# Patient Record
Sex: FEMALE | Race: BLACK OR AFRICAN AMERICAN | NOT HISPANIC OR LATINO | URBAN - METROPOLITAN AREA
[De-identification: names, ages, dates, MRNs, and addresses within clinical notes are randomized per-mention and may not be internally consistent; named-entity substitution may affect disease eponyms.]

---

## 2017-10-06 ENCOUNTER — HISTORICAL (OUTPATIENT)
Dept: RADIOLOGY | Facility: HOSPITAL | Age: 62
End: 2017-10-06

## 2019-01-16 ENCOUNTER — HISTORICAL (OUTPATIENT)
Dept: RADIOLOGY | Facility: HOSPITAL | Age: 64
End: 2019-01-16

## 2019-04-16 ENCOUNTER — HISTORICAL (OUTPATIENT)
Dept: RADIOLOGY | Facility: HOSPITAL | Age: 64
End: 2019-04-16

## 2023-03-14 ENCOUNTER — HOSPITAL ENCOUNTER (OUTPATIENT)
Dept: RADIOLOGY | Facility: HOSPITAL | Age: 68
Discharge: HOME | End: 2023-03-14
Attending: INTERNAL MEDICINE
Payer: COMMERCIAL

## 2023-03-14 DIAGNOSIS — Z12.31 BREAST CANCER SCREENING BY MAMMOGRAM: ICD-10-CM

## 2023-03-14 PROCEDURE — 77067 SCR MAMMO BI INCL CAD: CPT | Mod: TC

## 2023-03-14 PROCEDURE — 77063 BREAST TOMOSYNTHESIS BI: CPT | Mod: 26,,, | Performed by: RADIOLOGY

## 2023-03-14 PROCEDURE — 77067 MAMMO DIGITAL SCREENING BILAT WITH TOMO: ICD-10-PCS | Mod: 26,,, | Performed by: RADIOLOGY

## 2023-03-14 PROCEDURE — 77067 SCR MAMMO BI INCL CAD: CPT | Mod: 26,,, | Performed by: RADIOLOGY

## 2023-03-14 PROCEDURE — 77063 MAMMO DIGITAL SCREENING BILAT WITH TOMO: ICD-10-PCS | Mod: 26,,, | Performed by: RADIOLOGY

## 2023-12-22 ENCOUNTER — OFFICE VISIT (OUTPATIENT)
Dept: URGENT CARE | Facility: CLINIC | Age: 68
End: 2023-12-22
Payer: COMMERCIAL

## 2023-12-22 VITALS
RESPIRATION RATE: 18 BRPM | DIASTOLIC BLOOD PRESSURE: 71 MMHG | TEMPERATURE: 98 F | HEIGHT: 62 IN | BODY MASS INDEX: 23 KG/M2 | HEART RATE: 50 BPM | OXYGEN SATURATION: 97 % | WEIGHT: 125 LBS | SYSTOLIC BLOOD PRESSURE: 163 MMHG

## 2023-12-22 DIAGNOSIS — T14.8XXA MUSCLE STRAIN: Primary | ICD-10-CM

## 2023-12-22 PROCEDURE — 99213 OFFICE O/P EST LOW 20 MIN: CPT | Mod: ,,, | Performed by: FAMILY MEDICINE

## 2023-12-22 PROCEDURE — 99213 PR OFFICE/OUTPT VISIT, EST, LEVL III, 20-29 MIN: ICD-10-PCS | Mod: ,,, | Performed by: FAMILY MEDICINE

## 2023-12-22 RX ORDER — ACETAMINOPHEN 500 MG
1500 TABLET ORAL
COMMUNITY

## 2023-12-22 RX ORDER — TRAZODONE HYDROCHLORIDE 100 MG/1
100 TABLET ORAL NIGHTLY
COMMUNITY

## 2023-12-22 RX ORDER — METOPROLOL SUCCINATE 50 MG/1
50 TABLET, EXTENDED RELEASE ORAL EVERY MORNING
COMMUNITY

## 2023-12-22 RX ORDER — ATORVASTATIN CALCIUM 20 MG/1
1 TABLET, FILM COATED ORAL NIGHTLY
COMMUNITY
Start: 2023-07-13 | End: 2024-07-12

## 2023-12-22 RX ORDER — CYCLOBENZAPRINE HCL 5 MG
5 TABLET ORAL 3 TIMES DAILY PRN
Qty: 15 TABLET | Refills: 0 | Status: SHIPPED | OUTPATIENT
Start: 2023-12-22

## 2023-12-22 RX ORDER — ALBUTEROL SULFATE 90 UG/1
AEROSOL, METERED RESPIRATORY (INHALATION)
COMMUNITY
Start: 2023-01-03

## 2023-12-22 RX ORDER — AMLODIPINE BESYLATE 10 MG/1
1 TABLET ORAL EVERY MORNING
COMMUNITY
Start: 2023-10-20 | End: 2024-10-19

## 2023-12-22 RX ORDER — PREDNISONE 20 MG/1
40 TABLET ORAL DAILY
Qty: 8 TABLET | Refills: 0 | Status: SHIPPED | OUTPATIENT
Start: 2023-12-22 | End: 2023-12-26

## 2023-12-22 NOTE — PROGRESS NOTES
Patient ID: [id number]     Chief Complaint: back pain    History of Present Illness:     Dena Barron is a 68 y.o. female  with a history of type 2 diabetes, current smoker, who presents today for symptoms of Pain ( Patient is a 68 y.o. female who presents to urgent care with complaints of pain in right shoulder under scapula that has moved down towards her pelvis. Has released some gas and that has helped X4 days. /NO injury)      Pt denies experiencing any fevers, chills, nausea, vomiting, bowel or bladder dysfunction, sensory deficits around the groin or inner thighs, lower extremity motor or sensory deficits, unexplained weight loss, IVDU, corticosteroid use, immunocompromising conditions, immunosuppressing drugs, or history of cancer.     Past Medical History:     ----------------------------  Anxiety  Arthritis  Asthma  Hyperlipidemia  Hypertension     History reviewed. No pertinent surgical history.    Review of patient's allergies indicates:   Allergen Reactions    Penicillins Swelling    Enalapril Other (See Comments)     COUGH       Outpatient Medications Marked as Taking for the 12/22/23 encounter (Office Visit) with Paco Marie MD   Medication Sig Dispense Refill    acetaminophen (TYLENOL) 500 MG tablet Take 1,500 mg by mouth.      albuterol (PROVENTIL/VENTOLIN HFA) 90 mcg/actuation inhaler INHALE 2 PUFFS INTO LUNGS BY MOUTH EVERY 4 HOURS AS NEEDED FOR WHEEZING      amLODIPine (NORVASC) 10 MG tablet Take 1 tablet by mouth every morning.      atorvastatin (LIPITOR) 20 MG tablet Take 1 tablet by mouth every evening.      metoprolol succinate (TOPROL-XL) 50 MG 24 hr tablet Take 50 mg by mouth every morning.      traZODone (DESYREL) 100 MG tablet Take 100 mg by mouth every evening.         Social History     Socioeconomic History    Marital status:    Tobacco Use    Smoking status: Every Day     Types: Cigarettes    Smokeless tobacco: Never   Substance and Sexual Activity    Alcohol use:  "Never    Drug use: Never        Family History   Problem Relation Age of Onset    Hypertension Mother     Diabetes Mother     Hypertension Brother     Diabetes Brother         Subjective:     Review of Systems   Constitutional:  Negative for chills, fever and malaise/fatigue.   Musculoskeletal:  Positive for back pain. Negative for falls.   Neurological:  Negative for dizziness, tingling, sensory change, speech change, focal weakness, seizures and loss of consciousness.       Objective:     BP (!) 163/71   Pulse (!) 50   Temp 98.3 °F (36.8 °C)   Resp 18   Ht 5' 2" (1.575 m)   Wt 56.7 kg (125 lb)   SpO2 97%   BMI 22.86 kg/m²     Physical Exam  Constitutional:       General: She is not in acute distress.     Appearance: Normal appearance. She is not ill-appearing or toxic-appearing.   HENT:      Head: Normocephalic and atraumatic.   Cardiovascular:      Rate and Rhythm: Normal rate and regular rhythm.   Pulmonary:      Effort: Pulmonary effort is normal.      Breath sounds: Normal breath sounds.       Musculoskeletal:         General: Tenderness present. No swelling, deformity or signs of injury.      Cervical back: No rigidity or tenderness.      Right lower leg: No edema.      Left lower leg: No edema.      Comments: Back:  No obvious deformity  No skin changes  Tenderness above the right scapula  Tenderness 6 in right of midline at about T9  No midline spinal tenderness   Lymphadenopathy:      Cervical: No cervical adenopathy.   Neurological:      General: No focal deficit present.      Mental Status: She is alert and oriented to person, place, and time.      Cranial Nerves: No cranial nerve deficit.      Sensory: No sensory deficit.      Motor: No weakness.      Coordination: Coordination normal.      Gait: Gait normal.      Deep Tendon Reflexes: Reflexes normal.   Psychiatric:         Mood and Affect: Mood normal.         Behavior: Behavior normal.         Thought Content: Thought content normal. "         Assessment & Plan:       ICD-10-CM ICD-9-CM   1. Muscle strain  T14.8XXA 848.9        1. Muscle strain    Other orders  -     predniSONE (DELTASONE) 20 MG tablet; Take 2 tablets (40 mg total) by mouth once daily. for 4 days  Dispense: 8 tablet; Refill: 0  -     cyclobenzaprine (FLEXERIL) 5 MG tablet; Take 1 tablet (5 mg total) by mouth 3 (three) times daily as needed for Muscle spasms.  Dispense: 15 tablet; Refill: 0         Lungs clear, exam reveals that source of discomfort is musculoskeletal.  Declines any shots.  Is diabetic but A1c is 5.9, diet-controlled.  She is okay with 4 days of oral prednisone and muscle relaxers.  Declines trigger point injections.  Return precautions given

## 2023-12-22 NOTE — PATIENT INSTRUCTIONS
Prednisone once daily for 4 days as needed for discomfort    Flexeril up to 3 times daily.  May cause drowsiness and dizziness, please use caution    Please return if symptoms are not improved after 4 days.    Go to the emergency room if you develop shortness of breath, chest pain or severe pain in your back

## 2024-08-03 ENCOUNTER — OFFICE VISIT (OUTPATIENT)
Dept: URGENT CARE | Facility: CLINIC | Age: 69
End: 2024-08-03
Payer: COMMERCIAL

## 2024-08-03 VITALS
WEIGHT: 125 LBS | OXYGEN SATURATION: 97 % | HEART RATE: 62 BPM | HEIGHT: 62 IN | DIASTOLIC BLOOD PRESSURE: 85 MMHG | TEMPERATURE: 99 F | BODY MASS INDEX: 23 KG/M2 | RESPIRATION RATE: 18 BRPM | SYSTOLIC BLOOD PRESSURE: 190 MMHG

## 2024-08-03 DIAGNOSIS — J06.9 URI WITH COUGH AND CONGESTION: Primary | ICD-10-CM

## 2024-08-03 DIAGNOSIS — R05.9 COUGH, UNSPECIFIED TYPE: ICD-10-CM

## 2024-08-03 LAB
CTP QC/QA: YES
SARS-COV-2 RDRP RESP QL NAA+PROBE: NEGATIVE

## 2024-08-03 PROCEDURE — 87635 SARS-COV-2 COVID-19 AMP PRB: CPT | Mod: QW,,,

## 2024-08-03 PROCEDURE — 99213 OFFICE O/P EST LOW 20 MIN: CPT | Mod: ,,,

## 2024-08-03 RX ORDER — PROMETHAZINE HYDROCHLORIDE AND DEXTROMETHORPHAN HYDROBROMIDE 6.25; 15 MG/5ML; MG/5ML
5 SYRUP ORAL NIGHTLY PRN
Qty: 118 ML | Refills: 0 | Status: SHIPPED | OUTPATIENT
Start: 2024-08-03 | End: 2024-08-13

## 2024-08-03 RX ORDER — PREDNISONE 20 MG/1
20 TABLET ORAL DAILY
Qty: 5 TABLET | Refills: 0 | Status: SHIPPED | OUTPATIENT
Start: 2024-08-03 | End: 2024-08-08

## 2024-08-03 NOTE — PROGRESS NOTES
"Subjective:      Patient ID: Dena Barron is a 68 y.o. female.    Vitals:  height is 5' 2" (1.575 m) and weight is 56.7 kg (125 lb). Her temperature is 99 °F (37.2 °C). Her blood pressure is 190/85 (abnormal) and her pulse is 62. Her respiration is 18 and oxygen saturation is 97%.     Chief Complaint: Cough     Patient is a 68 y.o. female who presents to urgent care with complaints of runny nose, nasal congestion, coughing, body aches, low grade fever x2 days.  She denies temperature greater than 100.4, neck stiffness rash, GI symptoms, shortness for breath.  She does report frontal sinus headache.  She is a smoker and does report some mild wheezing at home.  Blood pressure is elevated in clinic today however she reports she usually takes her blood pressure medication around this time this morning.  Did not take it yet.  Was previously prescribed promethazine DM and reports this helped with her cough.  Would like this for nighttime cough.    ROS   Objective:     Physical Exam   Constitutional: She is oriented to person, place, and time. She appears well-developed. She is cooperative.  Non-toxic appearance. She does not appear ill. No distress.   HENT:   Head: Normocephalic and atraumatic.   Ears:   Right Ear: Hearing, tympanic membrane, external ear and ear canal normal.   Left Ear: Hearing, tympanic membrane, external ear and ear canal normal.   Nose: Congestion present. No mucosal edema, rhinorrhea or nasal deformity. No epistaxis. Right sinus exhibits no maxillary sinus tenderness and no frontal sinus tenderness. Left sinus exhibits no maxillary sinus tenderness and no frontal sinus tenderness.   Mouth/Throat: Uvula is midline, oropharynx is clear and moist and mucous membranes are normal. Mucous membranes are moist. No trismus in the jaw. Normal dentition. No uvula swelling. No oropharyngeal exudate, posterior oropharyngeal edema or posterior oropharyngeal erythema.      Comments: Clear postnasal drip  Eyes: " Conjunctivae and lids are normal. No scleral icterus.   Neck: Trachea normal and phonation normal. Neck supple. No edema present. No erythema present. No neck rigidity present.   Cardiovascular: Normal rate, regular rhythm, normal heart sounds and normal pulses.   Pulmonary/Chest: Effort normal and breath sounds normal. No respiratory distress. She has no decreased breath sounds. She has no rhonchi.   Abdominal: Normal appearance.   Musculoskeletal: Normal range of motion.         General: No deformity. Normal range of motion.   Neurological: She is alert and oriented to person, place, and time. She exhibits normal muscle tone. Coordination normal.   Skin: Skin is warm, dry, intact, not diaphoretic and not pale.   Psychiatric: Her speech is normal and behavior is normal. Judgment and thought content normal.   Nursing note and vitals reviewed.      Assessment:     1. URI with cough and congestion    2. Cough, unspecified type        Plan:       URI with cough and congestion  -     POCT COVID-19 Rapid Screening  -     predniSONE (DELTASONE) 20 MG tablet; Take 1 tablet (20 mg total) by mouth once daily. for 5 days  Dispense: 5 tablet; Refill: 0  -     promethazine-dextromethorphan (PROMETHAZINE-DM) 6.25-15 mg/5 mL Syrp; Take 5 mLs by mouth nightly as needed (cough).  Dispense: 118 mL; Refill: 0    Cough, unspecified type      Negative COVID swab  Take steroids with food.  Drink plenty of fluids. Get plenty of rest.   Claritin, Zyrtec or other over-the-counter antihistamine for runny nose, postnasal drip, nasal congestion.  Nasal spray such as Nasacort or Flonase for congestion.  Over-the-counter cough medication with expectorant such as guaifenesin for daytime cough.  Prescription cough syrup nightly as it may make you drowsy, do not drive while taking.  Prescription cough syrup has antihistamine present, do not combine with other antihistamines or cough medications.  Over-the-counter decongestants such as Sudafed,  phenylephrine or pseudoephedrine.  Avoid these if you have a history of high blood pressure.  Warm saltwater gargles for sore throat.  Warm water with honey to help coat the throat.  Throat lozenges.  Chloraseptic spray for worsening sore throat.  Tylenol or ibuprofen as needed for sore throat and fever.  May alternate every 3 hours.    Call or return to clinic as needed   Go to the ER with any significant change or worsening of symptoms.   Follow up with your primary care doctor.            Smoking cessation strongly encouraged.  Assistance offered, information will be provided.  If not ready to quit now, patient will contact this clinic in the future if I can be of any specific health related to the discontinuation of smoking/tobacco use.

## 2024-08-03 NOTE — PATIENT INSTRUCTIONS
Negative COVID swab  Take steroids with food.  Drink plenty of fluids. Get plenty of rest.   Claritin, Zyrtec or other over-the-counter antihistamine for runny nose, postnasal drip, nasal congestion.  Nasal spray such as Nasacort or Flonase for congestion.  Over-the-counter cough medication with expectorant such as guaifenesin for daytime cough.  Prescription cough syrup nightly as it may make you drowsy, do not drive while taking.  Prescription cough syrup has antihistamine present, do not combine with other antihistamines or cough medications.  Over-the-counter decongestants such as Sudafed, phenylephrine or pseudoephedrine.  Avoid these if you have a history of high blood pressure.  Warm saltwater gargles for sore throat.  Warm water with honey to help coat the throat.  Throat lozenges.  Chloraseptic spray for worsening sore throat.  Tylenol or ibuprofen as needed for sore throat and fever.  May alternate every 3 hours.    Call or return to clinic as needed   Go to the ER with any significant change or worsening of symptoms.   Follow up with your primary care doctor.            Smoking cessation strongly encouraged.  Assistance offered, information will be provided.  If not ready to quit now, patient will contact this clinic in the future if I can be of any specific health related to the discontinuation of smoking/tobacco use.

## 2024-10-23 ENCOUNTER — OFFICE VISIT (OUTPATIENT)
Dept: URGENT CARE | Facility: CLINIC | Age: 69
End: 2024-10-23
Payer: COMMERCIAL

## 2024-10-23 VITALS
RESPIRATION RATE: 18 BRPM | HEART RATE: 50 BPM | SYSTOLIC BLOOD PRESSURE: 171 MMHG | DIASTOLIC BLOOD PRESSURE: 73 MMHG | TEMPERATURE: 98 F | WEIGHT: 122 LBS | HEIGHT: 63 IN | OXYGEN SATURATION: 96 % | BODY MASS INDEX: 21.62 KG/M2

## 2024-10-23 DIAGNOSIS — J32.9 SINUSITIS, UNSPECIFIED CHRONICITY, UNSPECIFIED LOCATION: ICD-10-CM

## 2024-10-23 DIAGNOSIS — R05.9 COUGH, UNSPECIFIED TYPE: Primary | ICD-10-CM

## 2024-10-23 PROCEDURE — 99213 OFFICE O/P EST LOW 20 MIN: CPT | Mod: ,,, | Performed by: NURSE PRACTITIONER

## 2024-10-23 RX ORDER — PROMETHAZINE HYDROCHLORIDE AND DEXTROMETHORPHAN HYDROBROMIDE 6.25; 15 MG/5ML; MG/5ML
5 SYRUP ORAL EVERY 4 HOURS PRN
Qty: 120 ML | Refills: 0 | Status: SHIPPED | OUTPATIENT
Start: 2024-10-23 | End: 2024-11-02

## 2024-10-23 RX ORDER — ALENDRONATE SODIUM 70 MG/1
1 TABLET ORAL
COMMUNITY
Start: 2024-08-28

## 2024-10-23 RX ORDER — DOXYCYCLINE 100 MG/1
100 CAPSULE ORAL 2 TIMES DAILY
Qty: 14 CAPSULE | Refills: 0 | Status: SHIPPED | OUTPATIENT
Start: 2024-10-23 | End: 2024-10-30

## 2024-10-23 RX ORDER — BUPROPION HYDROCHLORIDE 150 MG/1
1 TABLET ORAL EVERY MORNING
COMMUNITY
Start: 2024-08-19 | End: 2025-08-19

## 2024-10-23 NOTE — PATIENT INSTRUCTIONS
Sinusitis (Antibiotic Treatment)    The sinuses are air-filled spaces within the bones of the face. They connect to the inside of the nose. Sinusitis is an inflammation of the tissue lining the sinus cavity. Sinus inflammation can occur during a cold. It can also be due to allergies to pollens and other particles in the air. Sinusitis can cause symptoms of sinus congestion and fullness. A sinus infection causes fever, headache and facial pain. There is often green or yellow drainage from the nose or into the back of the throat (post-nasal drip). You have been given antibiotics to treat this condition.  Home care:  Take the full course of antibiotics as instructed. Do not stop taking them, even if you feel better.  Drink plenty of water, hot tea, and other liquids. This may help thin mucus. It also may promote sinus drainage.  Heat may help soothe painful areas of the face. Use a towel soaked in hot water. Or,  the shower and direct the hot spray onto your face. Using a vaporizer along with a menthol rub at night may also help.   An expectorant containing guaifenesin may help thin the mucus and promote drainage from the sinuses.  Over-the-counter decongestants may be used unless a similar medicine was prescribed. Nasal sprays work the fastest. Use one that contains phenylephrine or oxymetazoline. First blow the nose gently. Then use the spray. Do not use these medicines more often than directed on the label or symptoms may get worse. You may also use tablets containing pseudoephedrine. Avoid products that combine ingredients, because side effects may be increased. Read labels. You can also ask the pharmacist for help. (NOTE: Persons with high blood pressure should not use decongestants. They can raise blood pressure.)  Over-the-counter antihistamines may help if allergies contributed to your sinusitis.    Do not use nasal rinses or irrigation during an acute sinus infection, unless told to by your health care  provider. Rinsing may spread the infection to other sinuses.  Use acetaminophen or ibuprofen to control pain, unless another pain medicine was prescribed. (If you have chronic liver or kidney disease or ever had a stomach ulcer, talk with your doctor before using these medicines. Aspirin should never be used in anyone under 18 years of age who is ill with a fever. It may cause severe liver damage.)  Don't smoke. This can worsen symptoms.  Follow-up care  Follow up with your healthcare provider or our staff if you are not improving within the next week.  When to seek medical advice  Call your healthcare provider if any of these occur:  Facial pain or headache becoming more severe  Stiff neck  Unusual drowsiness or confusion  Swelling of the forehead or eyelids  Vision problems, including blurred or double vision  Fever of 100.4ºF (38ºC) or higher, or as directed by your healthcare provider  Seizure  Breathing problems  Symptoms not resolving within 10 days  Date Last Reviewed: 4/13/2015 © 2000-2016 [employer] [address] [place] [address]. All rights reserved. This information is not intended as a substitute for professional medical care. Always follow your healthcare professional's instructions.                                                                    Sinusitis   If your condition worsens or fails to improve we recommend that you receive another evaluation at the ER immediately or contact your PCP to discuss your concerns or return here. You must understand that you've received an urgent care treatment only and that you may be released before all your medical problems are known or treated. You the patient will arrange for followup care as instructed.   If we discussed that I think your illness is viral it will not respond to antibiotics and it will last 10-14 days. However, if over the next few days the symptoms worsen start the antibiotics I have given you.   If we discussed  "that you require antibiotics start them now and take them to completion.   If you are female and on BCP and do take the antibiotics, use additional methods to prevent pregnancy while on the antibiotics and for one cycle after.   Flonase (fluticasone) is a nasal spray which is available over the counter and may help with your symptoms, Asterpro (Azelastine) is a nasal antihistamine that can also be taken    Zyrtec D, Claritin D or allegra D can also help with symptoms of congestion and drainage.   If you have hypertension avoid using the "D" which is the decongestant   If you just have drainage you can take plain zyrtec, claritin or allegra   If you just have a congested feeling you can take pseudoephedrine (unless you have high blood pressure) which you have to sign for behind the counter. Do not buy the phenylephrine which is on the shelf as it is not effective   Rest and fluids are also important.   Tylenol or ibuprofen can also be used as directed for pain unless you have an allergy to them or medical condition such as stomach ulcers, kidney or liver disease or blood thinners etc for which you should not be taking these type of medications.   If you are flying in the next few days Afrin nose drops for the airplane flight upon take off and landing may help. Other than at those times refrain from using afrin.   If you were prescribed a narcotic do not drive or operate heavy machinery while taking these medications.     -Please take antibiotic to completion.  -Below are suggestions for symptomatic relief:              -Tylenol every 4 hours OR ibuprofen every 6 hours as needed for pain/fever.              -Salt water gargles to soothe throat pain.              -Chloroseptic spray also helps to numb throat pain.              -Nasal saline spray reduces inflammation and dryness.              -Warm face compresses to help with facial sinus pain/pressure.              -Vicks vapor rub at night.              -Flonase OTC " or Nasacort OTC for nasal congestion.              -Simple foods like chicken noodle soup.              -Delsym helps with coughing at night              -Zyrtec/Claritin during the day & Benadryl at night may help with allergies.

## 2024-10-23 NOTE — PROGRESS NOTES
"Subjective:      Patient ID: Dena Barron is a 68 y.o. female.    Vitals:  height is 5' 3" (1.6 m) and weight is 55.3 kg (122 lb). Her oral temperature is 98.4 °F (36.9 °C). Her blood pressure is 171/73 (abnormal) and her pulse is 50 (abnormal). Her respiration is 18 and oxygen saturation is 96%.     Chief Complaint: Cough     Patient is a 68 y.o. female who presents to urgent care with complaints of productive cough, congestion, body aches x 5 days. Alleviating factors include Nyquil and Robitussin DM with no relief. Patient denies fever, sore throat, chills. Patient had negative at home COVID test. Patient is requesting PCP referral.     ROS   Objective:     Physical Exam   Constitutional: She is oriented to person, place, and time. She appears well-developed. She is cooperative.  Non-toxic appearance. She does not appear ill. No distress.   HENT:   Head: Normocephalic and atraumatic.   Ears:   Right Ear: Hearing, tympanic membrane, external ear and ear canal normal.   Left Ear: Hearing, tympanic membrane, external ear and ear canal normal.   Nose: No mucosal edema, rhinorrhea or nasal deformity. No epistaxis. Right sinus exhibits maxillary sinus tenderness. Right sinus exhibits no frontal sinus tenderness. Left sinus exhibits maxillary sinus tenderness. Left sinus exhibits no frontal sinus tenderness.   Mouth/Throat: Uvula is midline, oropharynx is clear and moist and mucous membranes are normal. No trismus in the jaw. Normal dentition. No uvula swelling. No oropharyngeal exudate, posterior oropharyngeal edema or posterior oropharyngeal erythema.   Eyes: Conjunctivae and lids are normal. No scleral icterus.   Neck: Trachea normal and phonation normal. Neck supple. No edema present. No erythema present. No neck rigidity present.   Cardiovascular: Normal rate, regular rhythm, normal heart sounds and normal pulses.   Pulmonary/Chest: Effort normal and breath sounds normal. No respiratory distress. She has no " decreased breath sounds. She has no rhonchi.   Abdominal: Normal appearance.   Musculoskeletal: Normal range of motion.         General: No deformity. Normal range of motion.   Neurological: She is alert and oriented to person, place, and time. She exhibits normal muscle tone. Coordination normal.   Skin: Skin is warm, dry, intact, not diaphoretic and not pale.   Psychiatric: Her speech is normal and behavior is normal. Judgment and thought content normal.   Nursing note and vitals reviewed.         Previous History      Review of patient's allergies indicates:   Allergen Reactions    Penicillins Swelling    Enalapril Other (See Comments)     COUGH       Past Medical History:   Diagnosis Date    Anxiety     Arthritis     Asthma     Hyperlipidemia     Hypertension      Current Outpatient Medications   Medication Instructions    acetaminophen (TYLENOL) 1,500 mg    albuterol (PROVENTIL/VENTOLIN HFA) 90 mcg/actuation inhaler INHALE 2 PUFFS INTO LUNGS BY MOUTH EVERY 4 HOURS AS NEEDED FOR WHEEZING    alendronate (FOSAMAX) 70 MG tablet 1 tablet, Every 7 days    amLODIPine (NORVASC) 10 MG tablet 1 tablet, Oral, Every morning    atorvastatin (LIPITOR) 20 MG tablet 1 tablet, Oral, Nightly    buPROPion (WELLBUTRIN XL) 150 MG TB24 tablet 1 tablet, Every morning    cyclobenzaprine (FLEXERIL) 5 mg, Oral, 3 times daily PRN    doxycycline (VIBRAMYCIN) 100 mg, Oral, 2 times daily    metoprolol succinate (TOPROL-XL) 50 mg, Every morning    promethazine-dextromethorphan (PROMETHAZINE-DM) 6.25-15 mg/5 mL Syrp 5 mLs, Oral, Every 4 hours PRN    traZODone (DESYREL) 100 mg, Nightly     Past Surgical History:   Procedure Laterality Date    APPENDECTOMY       SECTION           Social History     Tobacco Use    Smoking status: Every Day     Types: Cigarettes    Smokeless tobacco: Never   Substance Use Topics    Alcohol use: Never    Drug use: Never        Physical Exam      Vital Signs Reviewed   BP (!) 171/73   Pulse (!) 50   Temp  "98.4 °F (36.9 °C) (Oral)   Resp 18   Ht 5' 3" (1.6 m)   Wt 55.3 kg (122 lb)   SpO2 96%   BMI 21.61 kg/m²        Procedures    Procedures     Labs     Results for orders placed or performed in visit on 08/03/24   POCT COVID-19 Rapid Screening    Collection Time: 08/03/24  9:23 AM   Result Value Ref Range    POC Rapid COVID Negative Negative     Acceptable Yes       Assessment:     1. Cough, unspecified type    2. Sinusitis, unspecified chronicity, unspecified location        Plan:   Patient wishes to check insurance and we will call for primary care physician on a later date    Sinusitis (Antibiotic Treatment)    The sinuses are air-filled spaces within the bones of the face. They connect to the inside of the nose. Sinusitis is an inflammation of the tissue lining the sinus cavity. Sinus inflammation can occur during a cold. It can also be due to allergies to pollens and other particles in the air. Sinusitis can cause symptoms of sinus congestion and fullness. A sinus infection causes fever, headache and facial pain. There is often green or yellow drainage from the nose or into the back of the throat (post-nasal drip). You have been given antibiotics to treat this condition.  Home care:  Take the full course of antibiotics as instructed. Do not stop taking them, even if you feel better.  Drink plenty of water, hot tea, and other liquids. This may help thin mucus. It also may promote sinus drainage.  Heat may help soothe painful areas of the face. Use a towel soaked in hot water. Or,  the shower and direct the hot spray onto your face. Using a vaporizer along with a menthol rub at night may also help.   An expectorant containing guaifenesin may help thin the mucus and promote drainage from the sinuses.  Over-the-counter decongestants may be used unless a similar medicine was prescribed. Nasal sprays work the fastest. Use one that contains phenylephrine or oxymetazoline. First blow the nose " gently. Then use the spray. Do not use these medicines more often than directed on the label or symptoms may get worse. You may also use tablets containing pseudoephedrine. Avoid products that combine ingredients, because side effects may be increased. Read labels. You can also ask the pharmacist for help. (NOTE: Persons with high blood pressure should not use decongestants. They can raise blood pressure.)  Over-the-counter antihistamines may help if allergies contributed to your sinusitis.    Do not use nasal rinses or irrigation during an acute sinus infection, unless told to by your health care provider. Rinsing may spread the infection to other sinuses.  Use acetaminophen or ibuprofen to control pain, unless another pain medicine was prescribed. (If you have chronic liver or kidney disease or ever had a stomach ulcer, talk with your doctor before using these medicines. Aspirin should never be used in anyone under 18 years of age who is ill with a fever. It may cause severe liver damage.)  Don't smoke. This can worsen symptoms.  Follow-up care  Follow up with your healthcare provider or our staff if you are not improving within the next week.  When to seek medical advice  Call your healthcare provider if any of these occur:  Facial pain or headache becoming more severe  Stiff neck  Unusual drowsiness or confusion  Swelling of the forehead or eyelids  Vision problems, including blurred or double vision  Fever of 100.4ºF (38ºC) or higher, or as directed by your healthcare provider  Seizure  Breathing problems  Symptoms not resolving within 10 days  Date Last Reviewed: 4/13/2015  © 9311-9395 [employer]. [address] [place] [address], [place] [address]. All rights reserved. This information is not intended as a substitute for professional medical care. Always follow your healthcare professional's instructions.                                                                    Sinusitis   If your condition  "worsens or fails to improve we recommend that you receive another evaluation at the ER immediately or contact your PCP to discuss your concerns or return here. You must understand that you've received an urgent care treatment only and that you may be released before all your medical problems are known or treated. You the patient will arrange for followup care as instructed.   If we discussed that I think your illness is viral it will not respond to antibiotics and it will last 10-14 days. However, if over the next few days the symptoms worsen start the antibiotics I have given you.   If we discussed that you require antibiotics start them now and take them to completion.   If you are female and on BCP and do take the antibiotics, use additional methods to prevent pregnancy while on the antibiotics and for one cycle after.   Flonase (fluticasone) is a nasal spray which is available over the counter and may help with your symptoms, Asterpro (Azelastine) is a nasal antihistamine that can also be taken    Zyrtec D, Claritin D or allegra D can also help with symptoms of congestion and drainage.   If you have hypertension avoid using the "D" which is the decongestant   If you just have drainage you can take plain zyrtec, claritin or allegra   If you just have a congested feeling you can take pseudoephedrine (unless you have high blood pressure) which you have to sign for behind the counter. Do not buy the phenylephrine which is on the shelf as it is not effective   Rest and fluids are also important.   Tylenol or ibuprofen can also be used as directed for pain unless you have an allergy to them or medical condition such as stomach ulcers, kidney or liver disease or blood thinners etc for which you should not be taking these type of medications.   If you are flying in the next few days Afrin nose drops for the airplane flight upon take off and landing may help. Other than at those times refrain from using afrin.   If you " were prescribed a narcotic do not drive or operate heavy machinery while taking these medications.     -Please take antibiotic to completion.  -Below are suggestions for symptomatic relief:              -Tylenol every 4 hours OR ibuprofen every 6 hours as needed for pain/fever.              -Salt water gargles to soothe throat pain.              -Chloroseptic spray also helps to numb throat pain.              -Nasal saline spray reduces inflammation and dryness.              -Warm face compresses to help with facial sinus pain/pressure.              -Vicks vapor rub at night.              -Flonase OTC or Nasacort OTC for nasal congestion.              -Simple foods like chicken noodle soup.              -Delsym helps with coughing at night              -Zyrtec/Claritin during the day & Benadryl at night may help with allergies.    Cough, unspecified type    Sinusitis, unspecified chronicity, unspecified location    Other orders  -     doxycycline (VIBRAMYCIN) 100 MG Cap; Take 1 capsule (100 mg total) by mouth 2 (two) times daily. for 7 days  Dispense: 14 capsule; Refill: 0  -     promethazine-dextromethorphan (PROMETHAZINE-DM) 6.25-15 mg/5 mL Syrp; Take 5 mLs by mouth every 4 (four) hours as needed (As needed for cough).  Dispense: 120 mL; Refill: 0

## 2025-05-01 ENCOUNTER — HOSPITAL ENCOUNTER (OUTPATIENT)
Dept: RADIOLOGY | Facility: HOSPITAL | Age: 70
Discharge: HOME | End: 2025-05-01
Attending: INTERNAL MEDICINE
Payer: COMMERCIAL

## 2025-05-01 DIAGNOSIS — M54.41 ACUTE BACK PAIN WITH SCIATICA, RIGHT: ICD-10-CM

## 2025-05-01 DIAGNOSIS — M54.42 ACUTE BACK PAIN WITH SCIATICA, LEFT: ICD-10-CM

## 2025-05-01 PROCEDURE — 72100 X-RAY EXAM L-S SPINE 2/3 VWS: CPT | Mod: TC
